# Patient Record
Sex: MALE | Race: WHITE | NOT HISPANIC OR LATINO | Employment: OTHER | URBAN - NONMETROPOLITAN AREA
[De-identification: names, ages, dates, MRNs, and addresses within clinical notes are randomized per-mention and may not be internally consistent; named-entity substitution may affect disease eponyms.]

---

## 2024-03-14 PROBLEM — R53.83 FATIGUE: Status: ACTIVE | Noted: 2024-03-14

## 2024-03-14 PROBLEM — R06.00 DYSPNEA: Status: ACTIVE | Noted: 2024-03-14

## 2024-03-14 PROBLEM — I63.9 STROKE (MULTI): Status: ACTIVE | Noted: 2024-03-14

## 2024-03-14 PROBLEM — E11.9 DIABETES MELLITUS (MULTI): Status: ACTIVE | Noted: 2024-03-14

## 2024-03-14 PROBLEM — I48.0 PAROXYSMAL ATRIAL FIBRILLATION (MULTI): Status: ACTIVE | Noted: 2024-03-14

## 2024-03-14 PROBLEM — R94.8 NONSPECIFIC ABNORMAL RESULTS OF FUNCTION STUDY: Status: ACTIVE | Noted: 2024-03-14

## 2024-03-14 PROBLEM — I10 ESSENTIAL HYPERTENSION, BENIGN: Status: ACTIVE | Noted: 2024-03-14

## 2024-03-14 PROBLEM — E66.9 OBESITY (BMI 30.0-34.9): Status: ACTIVE | Noted: 2024-03-14

## 2024-03-14 PROBLEM — E66.811 OBESITY (BMI 30.0-34.9): Status: ACTIVE | Noted: 2024-03-14

## 2024-03-14 PROBLEM — E78.5 HYPERLIPIDEMIA: Status: ACTIVE | Noted: 2024-03-14

## 2024-03-14 PROBLEM — I25.10 CORONARY ARTERY DISEASE INVOLVING NATIVE CORONARY ARTERY OF NATIVE HEART WITHOUT ANGINA PECTORIS: Status: ACTIVE | Noted: 2024-03-14

## 2024-03-14 RX ORDER — DILTIAZEM HYDROCHLORIDE 120 MG/1
1 CAPSULE, EXTENDED RELEASE ORAL 2 TIMES DAILY
COMMUNITY
Start: 2022-06-07 | End: 2024-05-03 | Stop reason: DRUGHIGH

## 2024-03-14 RX ORDER — POLYETHYLENE GLYCOL 1450
POWDER (GRAM) MISCELLANEOUS
COMMUNITY

## 2024-03-14 RX ORDER — ASPIRIN 81 MG/1
1 TABLET ORAL DAILY
COMMUNITY

## 2024-03-14 RX ORDER — ATORVASTATIN CALCIUM 80 MG/1
1 TABLET, FILM COATED ORAL DAILY
COMMUNITY
Start: 2022-07-08 | End: 2024-05-05

## 2024-03-14 RX ORDER — WARFARIN 4 MG/1
TABLET ORAL
COMMUNITY
End: 2024-06-07 | Stop reason: DRUGHIGH

## 2024-03-14 RX ORDER — CYCLOBENZAPRINE HCL 5 MG
TABLET ORAL
COMMUNITY

## 2024-03-14 RX ORDER — POTASSIUM CITRATE 10 MEQ/1
1 TABLET, EXTENDED RELEASE ORAL DAILY
COMMUNITY

## 2024-03-14 RX ORDER — MV-MIN/FOLIC/K1/LYCOPEN/LUTEIN 300-60 MCG
1 TABLET ORAL DAILY
COMMUNITY

## 2024-04-30 DIAGNOSIS — I25.10 ATHEROSCLEROTIC HEART DISEASE OF NATIVE CORONARY ARTERY WITHOUT ANGINA PECTORIS: ICD-10-CM

## 2024-04-30 DIAGNOSIS — E78.5 HYPERLIPIDEMIA, UNSPECIFIED: ICD-10-CM

## 2024-05-05 RX ORDER — DILTIAZEM HYDROCHLORIDE 30 MG/1
30 TABLET, FILM COATED ORAL 2 TIMES DAILY
Qty: 180 TABLET | Refills: 3 | Status: SHIPPED | OUTPATIENT
Start: 2024-05-05 | End: 2025-05-05

## 2024-05-05 RX ORDER — ATORVASTATIN CALCIUM 80 MG/1
80 TABLET, FILM COATED ORAL NIGHTLY
Qty: 90 TABLET | Refills: 3 | Status: SHIPPED | OUTPATIENT
Start: 2024-05-05

## 2024-06-07 ENCOUNTER — OFFICE VISIT (OUTPATIENT)
Dept: CARDIOLOGY | Facility: CLINIC | Age: 79
End: 2024-06-07
Payer: MEDICARE

## 2024-06-07 VITALS
DIASTOLIC BLOOD PRESSURE: 60 MMHG | SYSTOLIC BLOOD PRESSURE: 100 MMHG | BODY MASS INDEX: 30.61 KG/M2 | WEIGHT: 226 LBS | HEIGHT: 72 IN | HEART RATE: 80 BPM

## 2024-06-07 DIAGNOSIS — I95.9 HYPOTENSION, UNSPECIFIED HYPOTENSION TYPE: ICD-10-CM

## 2024-06-07 DIAGNOSIS — I10 ESSENTIAL HYPERTENSION, BENIGN: ICD-10-CM

## 2024-06-07 DIAGNOSIS — E66.9 OBESITY (BMI 30.0-34.9): ICD-10-CM

## 2024-06-07 DIAGNOSIS — E78.2 MIXED HYPERLIPIDEMIA: ICD-10-CM

## 2024-06-07 DIAGNOSIS — I48.0 PAROXYSMAL ATRIAL FIBRILLATION (MULTI): ICD-10-CM

## 2024-06-07 DIAGNOSIS — I25.10 CORONARY ARTERY DISEASE INVOLVING NATIVE CORONARY ARTERY OF NATIVE HEART WITHOUT ANGINA PECTORIS: Primary | ICD-10-CM

## 2024-06-07 PROCEDURE — 3078F DIAST BP <80 MM HG: CPT | Performed by: INTERNAL MEDICINE

## 2024-06-07 PROCEDURE — 3074F SYST BP LT 130 MM HG: CPT | Performed by: INTERNAL MEDICINE

## 2024-06-07 PROCEDURE — 99214 OFFICE O/P EST MOD 30 MIN: CPT | Performed by: INTERNAL MEDICINE

## 2024-06-07 PROCEDURE — 4004F PT TOBACCO SCREEN RCVD TLK: CPT | Performed by: INTERNAL MEDICINE

## 2024-06-07 PROCEDURE — 1159F MED LIST DOCD IN RCRD: CPT | Performed by: INTERNAL MEDICINE

## 2024-06-07 RX ORDER — FLUDROCORTISONE ACETATE 0.1 MG/1
0.1 TABLET ORAL DAILY
Qty: 90 TABLET | Refills: 3 | Status: SHIPPED | OUTPATIENT
Start: 2024-06-07 | End: 2025-06-07

## 2024-06-07 RX ORDER — WARFARIN 3 MG/1
1.5 TABLET ORAL
COMMUNITY

## 2024-06-07 NOTE — PROGRESS NOTES
Subjective   Marcela Oconnor is a 79 y.o. male       Chief Complaint    Follow-up          HPI     Review of Systems   All other systems reviewed and are negative.     Patient returns in follow-up of problems as noted.  In the interim he is done relatively well.  He is, though, manifesting some low blood pressure and it sounds like when he is up walking he gets leg weakness and easy fatigability.  I believe this to be on the basis of low blood pressure which in turn as a consequence of his low-dose diltiazem therapy which is needed for rate control of his atrial fibrillation.  Because of this and the fact he has a normal echocardiogram will believe it would be reasonable to implement Florinef therapy in an effort to raise blood pressure and prevent postural instability and/or hypotension.  This was explained to him and his wife and they agree.    In regards to coronary disease he has no angina or other symptoms.  Increased BMI is noted and the merits of a modest diet and exercise were advocated.      Vitals:    06/07/24 0958   BP: 100/60   BP Location: Left arm   Patient Position: Sitting   Pulse: 80   Weight: 103 kg (226 lb)   Height: 1.829 m (6')        Objective   Physical Exam  Constitutional:       Appearance: Normal appearance.   HENT:      Nose: Nose normal.   Neck:      Vascular: No carotid bruit.   Cardiovascular:      Rate and Rhythm: Normal rate.      Pulses: Normal pulses.      Heart sounds: Normal heart sounds.   Pulmonary:      Effort: Pulmonary effort is normal.   Abdominal:      General: Bowel sounds are normal.      Palpations: Abdomen is soft.   Musculoskeletal:         General: Normal range of motion.      Cervical back: Normal range of motion.      Right lower leg: No edema.      Left lower leg: No edema.   Skin:     General: Skin is warm and dry.   Neurological:      General: No focal deficit present.      Mental Status: He is alert.   Psychiatric:         Mood and Affect: Mood normal.          Behavior: Behavior normal.         Thought Content: Thought content normal.         Judgment: Judgment normal.         Allergies  Patient has no known allergies.     Current Medications    Current Outpatient Medications:     aspirin 81 mg EC tablet, Take 1 tablet (81 mg) by mouth once daily., Disp: , Rfl:     atorvastatin (Lipitor) 80 mg tablet, TAKE 1 TABLET BY MOUTH AT BEDTIME, Disp: 90 tablet, Rfl: 3    cyclobenzaprine (Flexeril) 5 mg tablet, Take by mouth., Disp: , Rfl:     dilTIAZem (Cardizem) 30 mg immediate release tablet, Take 1 tablet (30 mg) by mouth 2 times a day., Disp: 180 tablet, Rfl: 3    mv-min-folic-K1-lycopen-lutein (Centrum Silver Men) 888--300 mcg tablet, Take 1 tablet by mouth once daily., Disp: , Rfl:     polyethylene glycol 1450,bulk, powder, USE AS DIRECTED., Disp: , Rfl:     potassium citrate CR (Urocit-K-10) 10 mEq ER tablet, Take 1 tablet (10 mEq) by mouth once daily., Disp: , Rfl:     warfarin (Coumadin) 3 mg tablet, Take 1.5 tablets (4.5 mg) by mouth. Take as directed per After Visit Summary., Disp: , Rfl:                      Assessment/Plan   1. Coronary artery disease involving native coronary artery of native heart without angina pectoris  No recurrence of symptoms based upon review of systems with him.    2. Essential hypertension, benign  Review of treatment strategy demonstrates low blood pressure.  I believe fludrocortisone should be added to raise blood pressure.  This in turn I hope will prevent generalized weakness that he has been experiencing when standing and/or ambulating    3. Mixed hyperlipidemia  Review of treatment strategy demonstrates acceptable control    4. Paroxysmal atrial fibrillation (Multi)  Most likely persistent or chronic.  Rate is adequately controlled on current therapy    5. Obesity (BMI 30.0-34.9)  The merits of a modest diet and weight loss were advocated        Scribe Attestation  By signing my name below, Courtney YAN LPN   , Scribe   attest that  this documentation has been prepared under the direction and in the presence of Thiago Chavira MD.     Provider Attestation - Scribe documentation    All medical record entries made by the Scribe were at my direction and personally dictated by me. I have reviewed the chart and agree that the record accurately reflects my personal performance of the history, physical exam, discussion and plan.

## 2024-06-17 DIAGNOSIS — I25.10 ATHEROSCLEROTIC HEART DISEASE OF NATIVE CORONARY ARTERY WITHOUT ANGINA PECTORIS: ICD-10-CM

## 2024-06-17 RX ORDER — DILTIAZEM HYDROCHLORIDE 30 MG/1
30 TABLET, FILM COATED ORAL 2 TIMES DAILY
Qty: 180 TABLET | Refills: 3 | Status: SHIPPED | OUTPATIENT
Start: 2024-06-17 | End: 2025-06-17

## 2024-06-24 ENCOUNTER — TELEPHONE (OUTPATIENT)
Dept: CARDIOLOGY | Facility: CLINIC | Age: 79
End: 2024-06-24
Payer: MEDICARE

## 2024-06-24 NOTE — TELEPHONE ENCOUNTER
Patient left a message for refill on Lipitor 80 mg daily. 12/2024 visit pending with Dr. Díaz Rx was filled 5/5/2024, advised to call [facility]

## 2024-07-03 ENCOUNTER — APPOINTMENT (OUTPATIENT)
Dept: CARDIOLOGY | Facility: CLINIC | Age: 79
End: 2024-07-03
Payer: MEDICARE

## 2024-07-11 ENCOUNTER — TELEPHONE (OUTPATIENT)
Dept: CARDIOLOGY | Facility: CLINIC | Age: 79
End: 2024-07-11
Payer: MEDICARE

## 2024-07-11 NOTE — TELEPHONE ENCOUNTER
Pre-admission testing from [facility] left vm to check status of POC and warfarin hold. See scanned media document under medication hold request and advise. Thank you     Callback [phone]

## 2024-12-16 ENCOUNTER — APPOINTMENT (OUTPATIENT)
Dept: CARDIOLOGY | Facility: CLINIC | Age: 79
End: 2024-12-16
Payer: MEDICARE

## 2024-12-16 VITALS
DIASTOLIC BLOOD PRESSURE: 62 MMHG | SYSTOLIC BLOOD PRESSURE: 110 MMHG | HEIGHT: 72 IN | WEIGHT: 208 LBS | HEART RATE: 72 BPM | BODY MASS INDEX: 28.17 KG/M2

## 2024-12-16 DIAGNOSIS — E11.9 TYPE 2 DIABETES MELLITUS WITHOUT COMPLICATION, WITH LONG-TERM CURRENT USE OF INSULIN (MULTI): ICD-10-CM

## 2024-12-16 DIAGNOSIS — I48.11 LONGSTANDING PERSISTENT ATRIAL FIBRILLATION (MULTI): Primary | ICD-10-CM

## 2024-12-16 DIAGNOSIS — I95.1 ORTHOSTATIC HYPOTENSION: ICD-10-CM

## 2024-12-16 DIAGNOSIS — E78.2 MIXED HYPERLIPIDEMIA: ICD-10-CM

## 2024-12-16 DIAGNOSIS — I25.10 CORONARY ARTERY DISEASE INVOLVING NATIVE CORONARY ARTERY OF NATIVE HEART WITHOUT ANGINA PECTORIS: ICD-10-CM

## 2024-12-16 DIAGNOSIS — I95.9 HYPOTENSION, UNSPECIFIED HYPOTENSION TYPE: ICD-10-CM

## 2024-12-16 DIAGNOSIS — Z79.4 TYPE 2 DIABETES MELLITUS WITHOUT COMPLICATION, WITH LONG-TERM CURRENT USE OF INSULIN (MULTI): ICD-10-CM

## 2024-12-16 DIAGNOSIS — Z79.01 LONG TERM CURRENT USE OF ANTICOAGULANT THERAPY: ICD-10-CM

## 2024-12-16 DIAGNOSIS — F17.220 CHEWING TOBACCO DEPENDENCE: ICD-10-CM

## 2024-12-16 PROBLEM — I48.0 PAROXYSMAL ATRIAL FIBRILLATION (MULTI): Status: RESOLVED | Noted: 2024-03-14 | Resolved: 2024-12-16

## 2024-12-16 PROBLEM — E66.811 OBESITY (BMI 30.0-34.9): Status: RESOLVED | Noted: 2024-03-14 | Resolved: 2024-12-16

## 2024-12-16 PROCEDURE — 99213 OFFICE O/P EST LOW 20 MIN: CPT | Performed by: NURSE PRACTITIONER

## 2024-12-16 PROCEDURE — 4004F PT TOBACCO SCREEN RCVD TLK: CPT | Performed by: NURSE PRACTITIONER

## 2024-12-16 PROCEDURE — 3074F SYST BP LT 130 MM HG: CPT | Performed by: NURSE PRACTITIONER

## 2024-12-16 PROCEDURE — 3078F DIAST BP <80 MM HG: CPT | Performed by: NURSE PRACTITIONER

## 2024-12-16 PROCEDURE — 1159F MED LIST DOCD IN RCRD: CPT | Performed by: NURSE PRACTITIONER

## 2024-12-16 PROCEDURE — 1160F RVW MEDS BY RX/DR IN RCRD: CPT | Performed by: NURSE PRACTITIONER

## 2024-12-16 RX ORDER — FLUDROCORTISONE ACETATE 0.1 MG/1
0.1 TABLET ORAL DAILY
Qty: 90 TABLET | Refills: 3 | Status: SHIPPED | OUTPATIENT
Start: 2024-12-16 | End: 2025-12-16

## 2024-12-16 RX ORDER — CARBIDOPA AND LEVODOPA 10; 100 MG/1; MG/1
1 TABLET ORAL 3 TIMES DAILY
COMMUNITY
Start: 2024-11-11 | End: 2025-11-11

## 2024-12-16 ASSESSMENT — ENCOUNTER SYMPTOMS
IRREGULAR HEARTBEAT: 0
ORTHOPNEA: 0
SYNCOPE: 0
DYSPNEA ON EXERTION: 0
PND: 0
PALPITATIONS: 0
NEAR-SYNCOPE: 0

## 2024-12-16 NOTE — PATIENT INSTRUCTIONS
Physical Therapy Evaluation    Visit Type: Initial Evaluation  Visit: 1  Referring Provider: Suma Ramirez PA-C  Medical Diagnosis (from order): M25.511 - Right shoulder pain, unspecified chronicity   Treatment Diagnosis: right shoulder - increased pain/symptoms, impaired range of motion, impaired strength, impaired muscle length/flexibility and impaired activity tolerance.  Onset  - Date of Surgery:  5/21/2024  - Procedure: Right Shoulder Arthroscopy, Rotator Cuff Repair, Biceps Tenotomy, Subacromial Decompression, Distal Clavicle Resection - Right  Chart reviewed at time of initial evaluation (relevant co-morbidities, allergies, tests and medications listed):   Uterine cancer with hysterectomy  Cardiac hx (SVT with ablation Apr 2024)  Right knee replacement  Esophagectomy 2018  Osteopenia        SUBJECTIVE                                                                                                               4 weeks post-op right shoulder scope on 5/21.  She is to follow the Type 2 rotator cuff repair protocol.    She's having a lot of pain.  It's actually worse in her neck than her shoulder.  She can't turn her head at all.  It goes from her neck up the base of her skull.  She tries to go for walks, but after a mile the pain starts coming and she has a hard time making it home.    Pain / Symptoms  - Pain rating (out of 10): Current: 8 (in the neck) ; Worst: 9  - Location: It's mostly her neck.    Function:   Limitations / Exacerbation Factors:   - Please refer to Quick Dash in Outcomes section    Prior Level of Function:, Normal    Patient Goals: decreased pain and increased strength.    Prior treatment  - no therapies  - Discharged from hospital, home health, or skilled nursing facility in last 30 days: no  Home Environment   - Patient lives with: Lives with: her  and adult son.  - Assistance available: consistent  - Denies 2 or more falls or an unexplained fall with injury in the last year.  -  Please bring all medicines, vitamins, and herbal supplements with you when you come to the office.    Prescriptions will not be filled unless you are compliant with your follow up appointments or have a follow up appointment scheduled as per instruction of your physician. Refills should be requested at the time of your visit.     Fall Prevention Education Given     PLAN:   Through informed decision making process incorporating patients unique circumstances, the following treatment plan will be initiated:    1.  Prescription drug management of cardiovascular medication for efficacy, adherence to treatment, side effect assessment and polypharmacy. Current treatment clinically warranted and to continue without modifications.    2. Return for follow-up; in the interim, contact the office if new symptoms arise.  Dr. Venegas as scheduled        Ways to Help Prevent Falls at Home    Quick Tips   ? Ask for help if you need it. Most people want to help!   ? Get up slowly after sitting or laying down   ? Wear a medical alert device or keep cell phone in your pocket   ? Use night lights, especially areas near a bathroom   ? Keep the items you use often within reach on a small stool or end table   ? Use an assistive device such as walker or cane, as directed by provider/physical therapy   ? Use a non-slip mat and grab bars in your bathroom. Look for home health sections for best options     Other Areas to Focus On   ? Exercise and nutrition: Regular exercise or taking a falls prevention class are great ways improve strength and balance. Don’t forget to stay hydrated and bring a snack!   ? Medicine side effects: Some medicines can make you sleepy or dizzy, which could cause a fall. Ask your healthcare provider about the side effects your medicines could cause. Be sure to let them know if you take any vitamins or supplements as well.   ? Tripping hazards: Remove items you could trip on, such as loose mats, rugs, cords, and  Feel safe at home / work / school: yes      OBJECTIVE                                                                                                                    Posture:  -Forward head with hyperextended cervical curve  -Kyphosis      Range of Motion (ROM)   (degrees unless noted; active unless noted; norms in ( ); negative=lacking to 0, positive=beyond 0)  Shoulder:    - Flexion (180):         Right:   Passive: 90    - Internal Rotation:        - at 45°:             Right:   Passive: 30    - External Rotation:       - at 45°:             Right:   Passive: 30  Comments: Went very, very slowly to achieve ROM goals as above; complained of significant pain.         Palpation  Left  - Upper Trapezius: hypertonic and tenderness  Right  - Upper Trapezius: hypertonic and tenderness              Outcome/Assessments  Outcome Measures:   Quick Disabilities of the Arm, Shoulder and Hand: QuickDash Total Score (Score will not calculate if more then 2 questions are left blank): 86.36   (scored 0-100; a higher score indicates greater disability) see flowsheet for additional documentation    Treatment     Therapeutic Activity  - Explained the anatomy of the shoulder and what she had done surgically using the shoulder model  - Explained the Type 2 rotator cuff repair rehab protocol process  - ROM check    She asked if she could go to a [employer] game in [place] on Sunday.  I told her I don't recommend it given the 3 hour drive there and back, walking in from parking, sitting for nine innings - considering the pain she's in it's not a good idea.    Writer verbally educated and received verbal consent for hand placement, positioning of patient, and techniques to be performed today from patient for hand placement and palpation for techniques as described above and how they are pertinent to the patient's plan of care.      ASSESSMENT                                                                                                       clutter. Wear closed toe shoes with rubber soles.   ? Health and wellness: Get regular checkups with your healthcare provider, plus routine vision and hearing screenings. Talk with your healthcare provider about:   o Your medicines and the possible side effects - bring them in a bag if that is easier!   o Problems with balance or feeling dizzy   o Ways to promote bone health, such as Vitamin D and calcium supplements   o Questions or concerns about falling     *Ask your healthcare team if you have questions     ©[facility], 2022        63 year old patient has reported functional limitations listed above impacted by signs and symptoms consistent with treatment diagnosis below.  Treatment Diagnosis:   - Involved: right shoulder.  - Symptoms/impairments: increased pain/symptoms, impaired range of motion, impaired strength, impaired muscle length/flexibility and impaired activity tolerance.    4 weeks post-op, in a lot of pain.  The worst pain is actually in her neck.  This could be the combination of having the nerve block, the positioning of her neck to get the tube down her throat for anesthesia, and the typical neck pain people get after shoulder surgery (because their neck muscles compensate for lack of shoulder movement).  The ROM check went slowly, but after going very slowly into the target positions, she was able to achieve the goal ROM.  She understands this is a long, slow rehab process.    Prognosis: Patient will benefit from skilled therapy.  Rehabilitative potential is: good.  Predicted patient presentation: Low (stable) - Patient comorbidities and complexities, as defined above, will have little effect on progress for prescribed plan of care.  Education:   - Present and ready to learn: patient and patient's significant other  - Results of above outlined education: Verbalizes understanding    PLAN                                                                                                                         The following skilled interventions to be implemented to achieve goals listed below:  Therapeutic Activity (95134)  Manual Therapy (06704)  Therapeutic Exercise (09396)  Electrical Stimulation Unattended (38254 or )    Frequency / Duration for 4 months for an estimated total of 32 visits    Patient involved in and agreed to plan of care and goals.    Suggestions for next session as indicated: Progress per Type 2 rotator cuff repair protocol    Discontinue sling on 7/2    Goals  AROM right shoulder:  flexion &  scaption:  110 deg; IR to lumbar region; ER:  60 deg  Strength RUE:  4/5  The above improvements in impairments to assist in obtaining goals listed below  Long Term Goals: to be met by end of plan of care  1. Quick DASH: Patient will score 52.27% or lower on The Quick DASH to indicate a decreased level of impairment with lifting, carrying, household and self-care activity. (minimal clinically important difference: 14 of calculated score)  2. Be able to use right arm to reach into a cupboard, refrigerator, push up from the bed or chair, drive, brush hair, hold leash to walk the dog.      Therapy procedure time and total treatment time can be found documented on the Time Entry flowsheet

## 2024-12-16 NOTE — PROGRESS NOTES
"Chief Complaint  \" Seems to be doing pretty good\"    Reason for Visit  6-month follow-up  Patient presents to the office today for outpatient follow-up for atrial fibrillation, anticoagulation and primary prevention.  Last evaluated in clinic by Dr. Mckeon June 2024.  At that time, patient noted to have symptomatic orthostatic hypotension and was initiated on Florinef.  He has been compliant with dosage and believes it has been\" really helpful\"    Presents today ambulatory with cane and steady gait.  Accompanied by spouse  Patient denies any hospitalizations or significant changes to interval medical history since last office follow-up.   He follows routinely with PCP with annual labs.  He also reports being worked up for parkinsonian's disease and was initiated on Sinemet which is\" really helping\".  Due to this treatment he overall feels\" much better than what I was\".    History of Present Illness   Patient is an extremely pleasant 79-year-old gentleman who presents to the office today with no voiced cardiovascular complaints.  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  On Florinef there is no fluid retention or accelerated hypertension -prior complaints of leg weakness have completely abated.  He denies any type of palpitations.  Ambulated in from the parking lot without concerns.  They plan to go down to [place] over the winter where he will ride his electric bike.  He does report recent treatment for parkinsonian disease and actually is\" doing better\" than last office follow-up with Dr. Mckeon.    There was 1 accidental fall that occurred when he was trying to pull the hitch off of the truck.    Patient reports that overall has no complaint(s) of chest pain, chest pressure/discomfort, claudication, dyspnea, exertional chest pressure/discomfort, fatigue, and irregular heart beat    Daily activity:    4 METS  Denies any " change in exercise capacity or functional tolerance since last office visit.    He is on low-dose diltiazem for rate control of chronic atrial fibrillation.  Florinef was added due to leg weakness consistent with orthostatic hypotension, has had significant improvement in complaints.  Did discuss to keep himself well-hydrated, change positions slowly and if he has any concerns in the humid weather down in [place] he will update me.    Chewing tobacco : Has been significantly reducing but is not ready to quit.    Overall patient is pleased with current state of cardiovascular health.  At this time there are no indications for additional cardiovascular testing or need for medication changes.     Review of Systems   Cardiovascular:  Negative for chest pain, dyspnea on exertion, irregular heartbeat, leg swelling, near-syncope, orthopnea, palpitations, paroxysmal nocturnal dyspnea and syncope.        Visit Vitals  /62 (BP Location: Left arm, Patient Position: Sitting)   Pulse 72   Ht 1.829 m (6')   Wt 94.3 kg (208 lb)   BMI 28.21 kg/m²   Smoking Status Never   BSA 2.19 m²     Physical Exam  Vitals and nursing note reviewed.   Constitutional:       Appearance: Normal appearance.   Cardiovascular:      Rate and Rhythm: Normal rate. Rhythm irregularly irregular.      Heart sounds: Normal heart sounds.   Pulmonary:      Effort: Pulmonary effort is normal.      Breath sounds: Normal breath sounds.   Musculoskeletal:      Cervical back: Full passive range of motion without pain.      Right lower leg: No edema.      Left lower leg: No edema.   Skin:     General: Skin is cool.   Neurological:      Mental Status: He is alert and oriented to person, place, and time.   Psychiatric:         Attention and Perception: Attention normal.         Mood and Affect: Mood normal.         Behavior: Behavior is cooperative.      No Known Allergies    Current Outpatient Medications   Medication Instructions    aspirin 81 mg EC tablet 1  tablet, Daily    atorvastatin (LIPITOR) 80 mg, oral, Nightly    carbidopa-levodopa (Sinemet)  mg tablet 1 tablet, 3 times daily    cyclobenzaprine (Flexeril) 5 mg tablet Take by mouth.    dilTIAZem (CARDIZEM) 30 mg, oral, 2 times daily    fludrocortisone (FLORINEF) 0.1 mg, oral, Daily    mv-min-folic-K1-lycopen-lutein (Centrum Silver Men) 568--300 mcg tablet 1 tablet, Daily    polyethylene glycol 1450,bulk, powder USE AS DIRECTED.    potassium citrate CR (Urocit-K-10) 10 mEq ER tablet 1 tablet, Daily    warfarin (Coumadin) 3 mg tablet 1.5 tablets      Assessment:    Coronary artery disease involving native coronary artery of native heart without angina pectoris  January 2011 cardiac cath  A very small ostial diagonal with 80% stenosis-recommendation for medical treatment.  Otherwise trivial coronary artery disease.  LVEF 65%.    Current daily activity at 4 METS without concerning symptoms    Cardiac and Vasculature  May 2018 TTE  LVEF 60 to 65%  LVH mild  Left atrium normal  No significant valvular heart disease    Hyperlipidemia  Intensity statin  Annual labs have been completed through PCP    Longstanding persistent atrial fibrillation (Multi)  Chronic atrial fibrillation with treatment strategy of rate control on diltiazem    Orthostatic hypotension  Symptomatic complaints noted at June 2024 office visit with Dr. Mckeon- was initiated on Florinef daily with significant improvement.    Long term current use of anticoagulant therapy  CHADS VASc 5 chronically anticoagulated on Coumadin  Managed by PCP  Denies bleeding diatheses    BMI 28.0-28.9,adult  His weight is down approximately 18 pounds since June 2024.  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  Discussed red flag of unintentional weight loss    Chewing tobacco dependence  He has been cutting back on chewing tobacco  Encouraged to abstain     Plan:     Through informed decision making  process incorporating patients unique circumstances, the following treatment plan will be initiated:    1.  Prescription drug management of cardiovascular medication for efficacy, adherence to treatment, side effect assessment and polypharmacy. Current treatment clinically warranted and to continue without modifications.    2. Return for follow-up; in the interim, contact the office if new symptoms arise.  Dr. Venegas as scheduled    Ade Godoy MSN, APRN-CNP, PMHNP-[facility]  [place], [place]     Please excuse any errors in grammar or translation related to this dictation. Voice recognition software was utilized to prepare this document.

## 2024-12-16 NOTE — ASSESSMENT & PLAN NOTE
His weight is down approximately 18 pounds since June 2024.  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  Discussed red flag of unintentional weight loss

## 2024-12-16 NOTE — ASSESSMENT & PLAN NOTE
Symptomatic complaints noted at June 2024 office visit with Dr. Mckeon- was initiated on Florinef daily with significant improvement.

## 2024-12-16 NOTE — ASSESSMENT & PLAN NOTE
January 2011 cardiac cath  A very small ostial diagonal with 80% stenosis-recommendation for medical treatment.  Otherwise trivial coronary artery disease.  LVEF 65%.    Current daily activity at 4 METS without concerning symptoms

## 2025-04-02 ENCOUNTER — PATIENT MESSAGE (OUTPATIENT)
Dept: CARDIOLOGY | Facility: CLINIC | Age: 80
End: 2025-04-02
Payer: MEDICARE

## 2025-04-02 DIAGNOSIS — E78.5 HYPERLIPIDEMIA, UNSPECIFIED: ICD-10-CM

## 2025-04-03 RX ORDER — ATORVASTATIN CALCIUM 80 MG/1
80 TABLET, FILM COATED ORAL NIGHTLY
Qty: 90 TABLET | Refills: 3 | Status: SHIPPED | OUTPATIENT
Start: 2025-04-03

## 2025-05-02 ENCOUNTER — TELEPHONE (OUTPATIENT)
Dept: CARDIOLOGY | Facility: CLINIC | Age: 80
End: 2025-05-02
Payer: MEDICARE

## 2025-05-02 NOTE — TELEPHONE ENCOUNTER
PCP phoned that patient has a new diagnosis of CHF. Faxing over testing and OV notes. POV 7/25/2025. Requesting a sooner OV. Please advise.    To Dr. Georgie Venegas MD

## 2025-05-07 ENCOUNTER — OFFICE VISIT (OUTPATIENT)
Dept: CARDIOLOGY | Facility: CLINIC | Age: 80
End: 2025-05-07
Payer: MEDICARE

## 2025-05-07 VITALS
BODY MASS INDEX: 28.04 KG/M2 | HEIGHT: 72 IN | WEIGHT: 207 LBS | SYSTOLIC BLOOD PRESSURE: 108 MMHG | HEART RATE: 64 BPM | DIASTOLIC BLOOD PRESSURE: 60 MMHG

## 2025-05-07 DIAGNOSIS — R53.83 OTHER FATIGUE: ICD-10-CM

## 2025-05-07 DIAGNOSIS — I48.11 LONGSTANDING PERSISTENT ATRIAL FIBRILLATION (MULTI): ICD-10-CM

## 2025-05-07 DIAGNOSIS — I25.10 CORONARY ARTERY DISEASE INVOLVING NATIVE CORONARY ARTERY OF NATIVE HEART WITHOUT ANGINA PECTORIS: ICD-10-CM

## 2025-05-07 DIAGNOSIS — R06.02 SHORTNESS OF BREATH: Primary | ICD-10-CM

## 2025-05-07 DIAGNOSIS — Z79.01 LONG TERM CURRENT USE OF ANTICOAGULANT THERAPY: ICD-10-CM

## 2025-05-07 DIAGNOSIS — F17.220 CHEWING TOBACCO DEPENDENCE: ICD-10-CM

## 2025-05-07 PROCEDURE — 1159F MED LIST DOCD IN RCRD: CPT | Performed by: NURSE PRACTITIONER

## 2025-05-07 PROCEDURE — 3074F SYST BP LT 130 MM HG: CPT | Performed by: NURSE PRACTITIONER

## 2025-05-07 PROCEDURE — 3078F DIAST BP <80 MM HG: CPT | Performed by: NURSE PRACTITIONER

## 2025-05-07 PROCEDURE — 99214 OFFICE O/P EST MOD 30 MIN: CPT | Performed by: NURSE PRACTITIONER

## 2025-05-07 PROCEDURE — 1160F RVW MEDS BY RX/DR IN RCRD: CPT | Performed by: NURSE PRACTITIONER

## 2025-05-07 RX ORDER — POTASSIUM CHLORIDE 1500 MG/1
TABLET, EXTENDED RELEASE ORAL
COMMUNITY
Start: 2025-05-02

## 2025-05-07 RX ORDER — METOPROLOL SUCCINATE 25 MG/1
25 TABLET, EXTENDED RELEASE ORAL DAILY
Qty: 30 TABLET | Refills: 11 | Status: SHIPPED | OUTPATIENT
Start: 2025-05-07 | End: 2026-05-07

## 2025-05-07 RX ORDER — CARBIDOPA AND LEVODOPA 25; 100 MG/1; MG/1
TABLET ORAL
COMMUNITY
Start: 2025-04-30

## 2025-05-07 RX ORDER — FUROSEMIDE 40 MG/1
40 TABLET ORAL
COMMUNITY
Start: 2025-05-02

## 2025-05-07 NOTE — PATIENT INSTRUCTIONS
Please bring all medicines, vitamins, and herbal supplements with you when you come to the office.    Prescriptions will not be filled unless you are compliant with your follow up appointments or have a follow up appointment scheduled as per instruction of your physician. Refills should be requested at the time of your visit.     PLAN:   Through informed decision making process incorporating patients unique circumstances, the following treatment plan will be initiated:    1.  Prescription drug management of cardiovascular medication for efficacy, adherence to treatment, side effect assessment and polypharmacy. Current treatment clinically warranted and to continue with following modifications:    -  Stop florinef  -  Stop diltiazem   -  Begin toprol 25mg at bedtime    2. Return for follow-up; in the interim, contact the office if new symptoms arise.  NP one month

## 2025-05-07 NOTE — LETTER
"May 8, 2025     Lita Liang DO  [address] [place] [address]    Patient: Marcela Oconnor   YOB: 1945   Date of Visit: 5/7/2025       Dear Dr. Lita Liang DO:    Thank you for referring Marcela Oconnor to me for evaluation. Below are my notes for this consultation.  If you have questions, please do not hesitate to call me. I look forward to following your patient along with you.       Sincerely,     Ade Godoy, APRN-CNP      CC: No Recipients  ______________________________________________________________________________________    Chief Complaint  \"I have just been really weak\"    Reason for Visit  Add-on  Patient presents to the office today for outpatient follow-up for bilateral pleural effusions.   Last evaluated in clinic by myself Dec 2024.     Presents today in wheelchair for ease of transport.  Accompanied by spouse    History of Present Illness   Patient presents to the office today for earlier cardiology follow-up due to concerns for decompensated heart failure.  At the December 2024 office visit patient was doing exceedingly well, they then spent the next couple months in [place] where he would ride his bike daily and was very active.  They returned home last month and feel that he has\" gone downhill over the last 2 weeks\".  He had a CT scan completed due to nephrolithiasis and was reported to have a large right pleural effusion and moderate left pleural effusion.  He was seen by PCP who placed him on Lasix and completed lab work.  Unfortunately, I am unable to access any of this data.  The wife reports that he was anemic with 2 Hemoccult positive testing and has been referred to GI.  Is been very labile and elevated.  He started Lasix 40 mg with potassium coverage approximately 1 week ago and they report increased urinary output and that his weight is down 8 pounds.  They have a total of 14 days of dosing.  He overall reports feeling really weak.  He was able to ambulate from " the parking lot to the front office store but then had to utilize a wheelchair to get into the office.  Denies any change in 1 pillow orthopnea, describes PND.  Primary complaint continues to be dyspnea and fatigability.  He has increased bilateral lower extremity edema which is unusual for him.    He is scheduled for an echocardiogram on May 15, 2025 at [facility].    He complains of dysphagia.  There are no open wounds or blisters on his lower extremities.  Denies any palpitations.  Daily activity has very limited but denies any orthostatic stasis.    After close to 1 week of diuretics with reported 8 pound weight loss he has noted no significant improvement.  On exam he continues to have 2+ bilateral lower extremity edema extending to mid calf, that right long is decreased care home down, base of left.  I recommended that they be evaluated in the emergency department, both patient and spouse are very hesitant.  They have upcoming testing including CT of the lungs to follow-up on these lung nodules, new consult with GI and repeat lab work.    His wife did return back to clinic following the office visit questioning diuretic.  I reinforced recommendations that he should go to the emergency department.  I would expect that with 8 pound diuresis there should be some improvement in his dyspnea or bare minimum his lung sounds.  Discussed that he could need a thoracentesis.  Discussed concerns of malignancy.  Once again, reports he was in usual state of health and the symptoms have probably only been going on for 2 perhaps 4 weeks.  She may consider taking him to [facility] emergency department. (majority of care is thru [facility]).    Review of Systems   Constitutional: Positive for malaise/fatigue.   Cardiovascular:  Positive for leg swelling and paroxysmal nocturnal dyspnea. Negative for chest pain, dyspnea on exertion, irregular heartbeat, near-syncope, orthopnea, palpitations and syncope.   Respiratory:  Positive for  shortness of breath.         Visit Vitals  /60 (BP Location: Left arm, Patient Position: Sitting)   Pulse 64   Ht 1.829 m (6')   Wt 93.9 kg (207 lb)   BMI 28.07 kg/m²   Smoking Status Never   BSA 2.18 m²     Physical Exam  Vitals and nursing note reviewed.   Constitutional:       Appearance: Normal appearance.   Cardiovascular:      Rate and Rhythm: Normal rate. Rhythm irregularly irregular.      Heart sounds: Normal heart sounds.   Pulmonary:      Effort: Pulmonary effort is normal.      Breath sounds: Examination of the right-middle field reveals decreased breath sounds. Examination of the right-lower field reveals decreased breath sounds. Examination of the left-lower field reveals decreased breath sounds. Decreased breath sounds present.   Musculoskeletal:      Cervical back: Full passive range of motion without pain.      Right lower le+ Pitting Edema present.      Left lower le+ Pitting Edema present.   Skin:     General: Skin is cool.   Neurological:      Mental Status: He is alert and oriented to person, place, and time.   Psychiatric:         Attention and Perception: Attention normal.         Mood and Affect: Mood normal.         Behavior: Behavior is cooperative.          ALLERGIES:  Patient has no known allergies.    Current Outpatient Medications   Medication Instructions   • aspirin 81 mg EC tablet 1 tablet, Daily   • atorvastatin (LIPITOR) 80 mg, oral, Nightly   • carbidopa-levodopa (Sinemet)  mg tablet TAKE 1 TABLET BY MOUTH EVERY MORNING, noon, and before bedtime DAILY   • cyclobenzaprine (Flexeril) 5 mg tablet Take by mouth.   • furosemide (LASIX) 40 mg, Daily before breakfast   • metoprolol succinate XL (TOPROL-XL) 25 mg, oral, Daily, Do not crush or chew.   • mv-min-folic-K1-lycopen-lutein (Centrum Silver Men) 108--300 mcg tablet 1 tablet, Daily   • polyethylene glycol 1450,bulk, powder USE AS DIRECTED.   • potassium chloride CR 20 mEq ER tablet TAKE 1 TABLET BY MOUTH  "ONCE DAILY with furosemide.   • potassium citrate CR (Urocit-K-10) 10 mEq ER tablet 1 tablet, Daily   • warfarin (Coumadin) 3 mg tablet 1.5 tablets      Assessment:    Patient presents for evaluation due to progressive fluid retention, bilateral pleural effusions and decompensated heart failure (May 2019 EF 60%).  No noted improvement in overall symptoms despite diuretic and 8 pound diuresis.  He has had labile elevated INR's, reported positive Hemoccult and\" being anemic\"-denies any hematochezia or melena in the office today (possible high output heart failure) need to consider metastatic disease process (dysphagia, lung nodules).  Prognosis guarded and continued to recommend emergency evaluation and hospitalization for management.     Plan:   Continue diuretic (has additional week)  Labs and TTE have been ordered by PCP; repeat CT planned next week.   I will call next week    Ade Godoy MSN, APRN-CNP, PMHNP-[facility]  [place], [place]     Please excuse any errors in grammar or translation related to this dictation. Voice recognition software was utilized to prepare this document.    "

## 2025-05-08 ASSESSMENT — ENCOUNTER SYMPTOMS
PALPITATIONS: 0
NEAR-SYNCOPE: 0
SYNCOPE: 0
ORTHOPNEA: 0
DYSPNEA ON EXERTION: 0
SHORTNESS OF BREATH: 1
IRREGULAR HEARTBEAT: 0
PND: 1

## 2025-05-08 NOTE — PROGRESS NOTES
"Chief Complaint  \"I have just been really weak\"    Reason for Visit  Add-on  Patient presents to the office today for outpatient follow-up for bilateral pleural effusions.   Last evaluated in clinic by myself Dec 2024.     Presents today in wheelchair for ease of transport.  Accompanied by spouse    History of Present Illness   Patient presents to the office today for earlier cardiology follow-up due to concerns for decompensated heart failure.  At the December 2024 office visit patient was doing exceedingly well, they then spent the next couple months in [place] where he would ride his bike daily and was very active.  They returned home last month and feel that he has\" gone downhill over the last 2 weeks\".  He had a CT scan completed due to nephrolithiasis and was reported to have a large right pleural effusion and moderate left pleural effusion.  He was seen by PCP who placed him on Lasix and completed lab work.  Unfortunately, I am unable to access any of this data.  The wife reports that he was anemic with 2 Hemoccult positive testing and has been referred to GI.  Is been very labile and elevated.  He started Lasix 40 mg with potassium coverage approximately 1 week ago and they report increased urinary output and that his weight is down 8 pounds.  They have a total of 14 days of dosing.  He overall reports feeling really weak.  He was able to ambulate from the parking lot to the front office store but then had to utilize a wheelchair to get into the office.  Denies any change in 1 pillow orthopnea, describes PND.  Primary complaint continues to be dyspnea and fatigability.  He has increased bilateral lower extremity edema which is unusual for him.    He is scheduled for an echocardiogram on May 15, 2025 at [facility].    He complains of dysphagia.  There are no open wounds or blisters on his lower extremities.  Denies any palpitations.  Daily activity has very limited but denies any orthostatic " stasis.    After close to 1 week of diuretics with reported 8 pound weight loss he has noted no significant improvement.  On exam he continues to have 2+ bilateral lower extremity edema extending to mid calf, that right long is decreased FDC down, base of left.  I recommended that they be evaluated in the emergency department, both patient and spouse are very hesitant.  They have upcoming testing including CT of the lungs to follow-up on these lung nodules, new consult with GI and repeat lab work.    His wife did return back to clinic following the office visit questioning diuretic.  I reinforced recommendations that he should go to the emergency department.  I would expect that with 8 pound diuresis there should be some improvement in his dyspnea or bare minimum his lung sounds.  Discussed that he could need a thoracentesis.  Discussed concerns of malignancy.  Once again, reports he was in usual state of health and the symptoms have probably only been going on for 2 perhaps 4 weeks.  She may consider taking him to [facility] emergency department. (majority of care is thru [facility]).    Review of Systems   Constitutional: Positive for malaise/fatigue.   Cardiovascular:  Positive for leg swelling and paroxysmal nocturnal dyspnea. Negative for chest pain, dyspnea on exertion, irregular heartbeat, near-syncope, orthopnea, palpitations and syncope.   Respiratory:  Positive for shortness of breath.         Visit Vitals  /60 (BP Location: Left arm, Patient Position: Sitting)   Pulse 64   Ht 1.829 m (6')   Wt 93.9 kg (207 lb)   BMI 28.07 kg/m²   Smoking Status Never   BSA 2.18 m²     Physical Exam  Vitals and nursing note reviewed.   Constitutional:       Appearance: Normal appearance.   Cardiovascular:      Rate and Rhythm: Normal rate. Rhythm irregularly irregular.      Heart sounds: Normal heart sounds.   Pulmonary:      Effort: Pulmonary effort is normal.      Breath sounds: Examination of the right-middle field  "reveals decreased breath sounds. Examination of the right-lower field reveals decreased breath sounds. Examination of the left-lower field reveals decreased breath sounds. Decreased breath sounds present.   Musculoskeletal:      Cervical back: Full passive range of motion without pain.      Right lower le+ Pitting Edema present.      Left lower le+ Pitting Edema present.   Skin:     General: Skin is cool.   Neurological:      Mental Status: He is alert and oriented to person, place, and time.   Psychiatric:         Attention and Perception: Attention normal.         Mood and Affect: Mood normal.         Behavior: Behavior is cooperative.          ALLERGIES:  Patient has no known allergies.    Current Outpatient Medications   Medication Instructions    aspirin 81 mg EC tablet 1 tablet, Daily    atorvastatin (LIPITOR) 80 mg, oral, Nightly    carbidopa-levodopa (Sinemet)  mg tablet TAKE 1 TABLET BY MOUTH EVERY MORNING, noon, and before bedtime DAILY    cyclobenzaprine (Flexeril) 5 mg tablet Take by mouth.    furosemide (LASIX) 40 mg, Daily before breakfast    metoprolol succinate XL (TOPROL-XL) 25 mg, oral, Daily, Do not crush or chew.    mv-min-folic-K1-lycopen-lutein (Centrum Silver Men) 931--300 mcg tablet 1 tablet, Daily    polyethylene glycol 1450,bulk, powder USE AS DIRECTED.    potassium chloride CR 20 mEq ER tablet TAKE 1 TABLET BY MOUTH ONCE DAILY with furosemide.    potassium citrate CR (Urocit-K-10) 10 mEq ER tablet 1 tablet, Daily    warfarin (Coumadin) 3 mg tablet 1.5 tablets      Assessment:    Patient presents for evaluation due to progressive fluid retention, bilateral pleural effusions and decompensated heart failure (May 2019 EF 60%).  No noted improvement in overall symptoms despite diuretic and 8 pound diuresis.  He has had labile elevated INR's, reported positive Hemoccult and\" being anemic\"-denies any hematochezia or melena in the office today (possible high output heart " failure) need to consider metastatic disease process (dysphagia, lung nodules).  Prognosis guarded and continued to recommend emergency evaluation and hospitalization for management.     Plan:   Continue diuretic (has additional week)  Labs and TTE have been ordered by PCP; repeat CT planned next week.   I will call next week    Ade Godoy MSN, APRN-CNP, PMHNP-[facility]  [place], [place]     Please excuse any errors in grammar or translation related to this dictation. Voice recognition software was utilized to prepare this document.

## 2025-05-08 NOTE — ASSESSMENT & PLAN NOTE
January 2011 cardiac cath  A very small ostial diagonal with 80% stenosis-recommendation for medical treatment.  Otherwise trivial coronary artery disease.  LVEF 65%.

## 2025-05-16 ENCOUNTER — TELEPHONE (OUTPATIENT)
Dept: CARDIOLOGY | Facility: CLINIC | Age: 80
End: 2025-05-16
Payer: MEDICARE

## 2025-05-16 NOTE — TELEPHONE ENCOUNTER
----- Message from Ade Godoy sent at 5/15/2025 11:20 AM EDT -----  Can someone please call patient's wife and see how patient has been doing.  I attempted to call but there was no answer, I did leave a voicemail.  When I saw him in the office last week he had significant volume overload also right and left pleural effusions.  He had multiple testing to be done over at [facility].  I did encourage him to take him to [facility] emergency department but unclear if she did that.

## 2025-06-10 ENCOUNTER — APPOINTMENT (OUTPATIENT)
Dept: CARDIOLOGY | Facility: CLINIC | Age: 80
End: 2025-06-10
Payer: MEDICARE

## 2025-06-25 ENCOUNTER — APPOINTMENT (OUTPATIENT)
Dept: CARDIOLOGY | Facility: CLINIC | Age: 80
End: 2025-06-25
Payer: MEDICARE

## 2025-07-25 ENCOUNTER — APPOINTMENT (OUTPATIENT)
Dept: CARDIOLOGY | Facility: CLINIC | Age: 80
End: 2025-07-25
Payer: MEDICARE